# Patient Record
Sex: MALE | Employment: UNEMPLOYED | URBAN - METROPOLITAN AREA
[De-identification: names, ages, dates, MRNs, and addresses within clinical notes are randomized per-mention and may not be internally consistent; named-entity substitution may affect disease eponyms.]

---
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
## 2021-01-23 ENCOUNTER — ANCILLARY PROCEDURE (OUTPATIENT)
Dept: GENERAL RADIOLOGY | Facility: CLINIC | Age: 21
End: 2021-01-23
Attending: PHYSICIAN ASSISTANT
Payer: COMMERCIAL

## 2021-01-23 ENCOUNTER — OFFICE VISIT (OUTPATIENT)
Dept: URGENT CARE | Facility: URGENT CARE | Age: 21
End: 2021-01-23
Payer: COMMERCIAL

## 2021-01-23 VITALS
HEART RATE: 58 BPM | TEMPERATURE: 97.7 F | SYSTOLIC BLOOD PRESSURE: 117 MMHG | OXYGEN SATURATION: 99 % | DIASTOLIC BLOOD PRESSURE: 69 MMHG | RESPIRATION RATE: 16 BRPM

## 2021-01-23 DIAGNOSIS — J02.0 STREP THROAT: Primary | ICD-10-CM

## 2021-01-23 DIAGNOSIS — R04.2 BLOOD-TINGED SPUTUM: ICD-10-CM

## 2021-01-23 DIAGNOSIS — R07.81 RIB PAIN ON LEFT SIDE: ICD-10-CM

## 2021-01-23 DIAGNOSIS — R07.0 THROAT PAIN: ICD-10-CM

## 2021-01-23 DIAGNOSIS — R06.02 SOB (SHORTNESS OF BREATH): ICD-10-CM

## 2021-01-23 LAB
DEPRECATED S PYO AG THROAT QL EIA: POSITIVE
SARS-COV-2 RNA RESP QL NAA+PROBE: NORMAL
SPECIMEN SOURCE: ABNORMAL
SPECIMEN SOURCE: NORMAL

## 2021-01-23 PROCEDURE — U0003 INFECTIOUS AGENT DETECTION BY NUCLEIC ACID (DNA OR RNA); SEVERE ACUTE RESPIRATORY SYNDROME CORONAVIRUS 2 (SARS-COV-2) (CORONAVIRUS DISEASE [COVID-19]), AMPLIFIED PROBE TECHNIQUE, MAKING USE OF HIGH THROUGHPUT TECHNOLOGIES AS DESCRIBED BY CMS-2020-01-R: HCPCS | Performed by: PHYSICIAN ASSISTANT

## 2021-01-23 PROCEDURE — 87880 STREP A ASSAY W/OPTIC: CPT | Performed by: PHYSICIAN ASSISTANT

## 2021-01-23 PROCEDURE — 71101 X-RAY EXAM UNILAT RIBS/CHEST: CPT | Mod: LT | Performed by: RADIOLOGY

## 2021-01-23 PROCEDURE — 99214 OFFICE O/P EST MOD 30 MIN: CPT | Performed by: PHYSICIAN ASSISTANT

## 2021-01-23 PROCEDURE — U0005 INFEC AGEN DETEC AMPLI PROBE: HCPCS | Performed by: PHYSICIAN ASSISTANT

## 2021-01-23 RX ORDER — AMOXICILLIN 875 MG
875 TABLET ORAL 2 TIMES DAILY
Qty: 20 TABLET | Refills: 0 | Status: SHIPPED | OUTPATIENT
Start: 2021-01-23 | End: 2021-02-02

## 2021-01-23 RX ORDER — IBUPROFEN 800 MG/1
800 TABLET, FILM COATED ORAL 2 TIMES DAILY WITH MEALS
Qty: 20 TABLET | Refills: 0 | Status: SHIPPED | OUTPATIENT
Start: 2021-01-23 | End: 2021-02-02

## 2021-01-23 NOTE — PATIENT INSTRUCTIONS
"Discharge Instructions for COVID-19 Patients  You have--or may have--COVID-19. Please follow the instructions listed below.   If you have a weakened immune system, discuss with your doctor any other actions you need to take.  How can I protect others?  If you have symptoms (fever, cough, body aches or trouble breathing):    Stay home and away from others (self-isolate) until:  ? Your other symptoms have resolved (gotten better). And   ? You've had no fever--and no medicine that reduces fever--for 1 full day (24 hours). And   ? At least 10 days have passed since your symptoms started. (You may need to wait 20 days. Follow the advice of your care team.)  If you don't show symptoms, but testing showed that you have COVID-19:    Stay home and away from others (self-isolate) until at least 10 days have passed since the date of your first positive COVID-19 test.  During this time    Stay in your own room, even for meals. Use your own bathroom if you can.    Stay away from others in your home. No hugging, kissing or shaking hands. No visitors.    Don't go to work, school or anywhere else.    Clean \"high touch\" surfaces often (doorknobs, counters, handles). Use household cleaning spray or wipes.    You'll find a full list of  on the EPA website: www.epa.gov/pesticide-registration/list-n-disinfectants-use-against-sars-cov-2.    Cover your mouth and nose with a mask or other face covering to avoid spreading germs.    Wash your hands and face often. Use soap and water.    Caregivers in these groups are at risk for severe illness due to COVID-19:  ? People 65 years and older  ? People who live in a nursing home or long-term care facility  ? People with chronic disease (lung, heart, cancer, diabetes, kidney, liver, immunologic)  ? People who have a weakened immune system, including those who:    Are in cancer treatment    Take medicine that weakens the immune system, such as corticosteroids    Had a bone marrow or organ " transplant    Have an immune deficiency    Have poorly controlled HIV or AIDS    Are obese (body mass index of 40 or higher)    Smoke regularly    Caregivers should wear gloves while washing dishes, handling laundry and cleaning bedrooms and bathrooms.    Use caution when washing and drying laundry: Don't shake dirty laundry and use the warmest water setting that you can.    For more tips on managing your health at home, go to www.cdc.gov/coronavirus/2019-ncov/downloads/10Things.pdf.  How can I take care of myself at home?  1. Get lots of rest. Drink extra fluids (unless a doctor has told you not to).  2. Take Tylenol (acetaminophen) for fever or pain. If you have liver or kidney problems, ask your family doctor if it's okay to take Tylenol.   Adults can take either:   ? 650 mg (two 325 mg pills) every 4 to 6 hours, or   ? 1,000 mg (two 500 mg pills) every 8 hours as needed.  ? Note: Don't take more than 3,000 mg in one day. Acetaminophen is found in many medicines (both prescribed and over-the-counter medicines). Read all labels to be sure you don't take too much.   For children, check the Tylenol bottle for the right dose. The dose is based on the child's age or weight.  3. If you have other health problems (like cancer, heart failure, an organ transplant or severe kidney disease): Call your specialty clinic if you don't feel better in the next 2 days.  4. Know when to call 911. Emergency warning signs include:  ? Trouble breathing or shortness of breath  ? Pain or pressure in the chest that doesn't go away  ? Feeling confused like you haven't felt before, or not being able to wake up  ? Bluish-colored lips or face  5. Your doctor may have prescribed a blood thinner medicine. Follow their instructions.  Where can I get more information?    [facility] - About COVID-19: [email or web address]    CDC - What to Do If You're Sick: www.cdc.gov/coronavirus/2019-ncov/about/steps-when-sick.html    CDC -  Ending Home Isolation: www.cdc.gov/coronavirus/2019-ncov/hcp/disposition-in-home-patients.html    CDC - Caring for Someone: www.cdc.gov/coronavirus/2019-ncov/if-you-are-sick/care-for-someone.html    [facility] - Interim Guidance for Hospital Discharge to Home: www.health.[facility].[place]./diseases/coronavirus/hcp/hospdischarge.pdf    Below are the COVID-19 hotlines at the [place] Department of Health ([facility]). Interpreters are available.  ? For health questions: Call 300-597-2777 or 1-199.344.2116 (7 a.m. to 7 p.m.)  ? For questions about schools and childcare: Call 970-628-4573 or 1-673.566.9478 (7 a.m. to 7 p.m.)    For informational purposes only. Not to replace the advice of your health care provider. Clinically reviewed by Dr. Ceasar Moore.   Copyright   2020 [employer] [facility]. All rights reserved. [employer] [id number] - REV 01/05/21.

## 2021-01-23 NOTE — PROGRESS NOTES
Chief complaint:blood and saliva.  Red throat.  Left rib cage pain/clicking      Differential Diagnosis:  URI Adult/Peds:  Acute right otitis media, Acute left otitis media, Allergic rhinitis, Asthma exacerbation, Laryngitis, Peritonsillar abscess, Pneumonia, Sinusitis, Strep pharyngitis, Tonsilitis, Viral pharyngitis and Viral upper respiratory illness    Results for orders placed or performed in visit on 01/23/21   XR Ribs & Chest Lt 3v     Status: None    Narrative    XR RIBS & CHEST LT 3VW 1/23/2021 2:50 PM    HISTORY: Left upper axillary rib cracking with breathing. Blood in  salivary after brushing the back of his throat.; Throat pain; SOB  (shortness of breath); Rib pain on left side    COMPARISON: None.    FINDINGS: Negative chest. Both lungs clear. Heart size and pulmonary  vascularity normal. No left-sided rib fractures.    ANABELL ERVIN MD   Results for orders placed or performed in visit on 01/23/21   Streptococcus A Rapid Scr w Reflx to PCR     Status: Abnormal    Specimen: Throat   Result Value Ref Range    Strep Specimen Description Throat     Streptococcus Group A Rapid Screen Positive (A) NEG^Negative         ASSESSMENT:       ICD-10-CM    1. Strep throat  J02.0 amoxicillin (AMOXIL) 875 MG tablet   2. Throat pain  R07.0 Streptococcus A Rapid Scr w Reflx to PCR     Symptomatic COVID-19 Virus (Coronavirus) by PCR     XR Ribs & Chest Lt 3v     amoxicillin (AMOXIL) 875 MG tablet     CANCELED: XR Chest 2 Views   3. Blood-tinged sputum  R04.2    4. Rib pain on left side  R07.81 XR Ribs & Chest Lt 3v     ibuprofen (ADVIL/MOTRIN) 800 MG tablet     CANCELED: XR Chest 2 Views           PLAN: Positive for strep here today.  Contagious until on antibiotic for 24 hours.  Likely blood in the saliva is from his throat as his throat appears very  friable and he brushed the back of his throat.  Was an area that looked like it had been bleeding.  Strongly doubt it is coming from his lungs as he is not even  coughing.Not likely from stomach as he has no GERD or abdominal pain.   Given ibuprofen for the rib cage pain.  Likely strain.  X-ray showed no pneumothorax, no pneumonia or rib fracture.  I have discussed clinical findings with patient.  Side effects of medications discussed.  Symptomatic care is discussed.  I have discussed the possibility of  worsening symptoms and indication to RTC or go to the ER if they occur.  All questions are answered, patient indicates understanding of these issues and is in agreement with plan.   Patient care instructions are discussed/given at the end of visit.   Lots of rest and fluids.  Covid test pending. Covid instructions given.  Myra Ramon PA-C      SUBJECTIVE:  20-year-old male presents for spitting up blood in his saliva from the back of his throat.  Happened yesterday and 1 week ago.  Occurred after he brushed the back of his throat.  He denies any nasal discharge or nosebleeds.  He does have some mucus in the back of his throat.  He denies throat pain when he swallows.  No cough.  No fever.  Did have some chills yesterday.  No headache.  No vision changes.  No nausea or vomiting.  Does have a history of exercise-induced asthma.  Will uses inhaler with exercise or when his pollen allergies flare.  He is not noting any flareup of his allergies.  No known Covid exposure.  No diarrhea.  No abdominal pain.  No history of reflux.  Also has noticed some clicking left upper lateral rib cage when he breathes.  Occasional sharp pain in that same area.  Its not that he is short of breath but it hurts that area when he breathes and his rib cage move it moves.      Allergies   Allergen Reactions     No Known Drug Allergies        Past Medical History:   Diagnosis Date     Unspecified chronic suppurative otitis media             albuterol (PROAIR HFA/PROVENTIL HFA/VENTOLIN HFA) 108 (90 Base) MCG/ACT Inhaler, Inhale 2 puffs into the lungs every 6 hours as needed for shortness of  breath / dyspnea       EPINEPHrine (EPIPEN/ADRENACLICK/OR ANY BX GENERIC EQUIV) 0.3 MG/0.3ML injection 2-pack, Inject 0.3 mLs (0.3 mg) into the muscle once as needed for anaphylaxis       fluticasone (FLONASE) 50 MCG/ACT spray, USE 1 SPRAY INTO BOTH NOSTRILS DAILY       methylphenidate ER (CONCERTA) 18 MG CR tablet, Take 1 tablet (18 mg) by mouth every morning To fill on or after 5/1/16       nystatin-triamcinolone (MYCOLOG II) 001879-2.1 UNIT/GM-% external cream, Apply to area bid for 10 - 14 days, then bid as needed       VENTOLIN  (90 BASE) MCG/ACT Inhaler, INHALE TWO PUFFS BY MOUTH EVERY 6 HOURS AS NEEDED FOR SHORTNESS OF BREATH    No current facility-administered medications on file prior to visit.       Social History     Tobacco Use     Smoking status: Never Smoker     Smokeless tobacco: Never Used     Tobacco comment: mom smokes   Substance Use Topics     Alcohol use: No       ROS:  CONSTITUTIONAL: Negative for fatigue or fever.  EYES: Negative for eye problems.  ENT: Neg for ST.  RESP: No cough.  CV: as above.  GI: Negative for vomiting.  MUSCULOSKELETAL:  Negative for significant muscle or joint pains.  NEUROLOGIC: Negative for headaches.  SKIN: Negative for rash.  PSYCH: Normal mentation for age.    OBJECTIVE:  /69 (BP Location: Right arm, Patient Position: Sitting, Cuff Size: Adult Regular)   Pulse 58   Temp 97.7  F (36.5  C) (Oral)   Resp 16   SpO2 99%   GENERAL APPEARANCE: Healthy, alert and no distress.  EYES:Conjunctiva/sclera clear.  EARS: No cerumen.   Ear canals w/o erythema.  TM's intact w/o erythema.    NOSE/MOUTH: Nasal turbinates are red and inflamed.    SINUSES: No maxillary sinus tenderness.  THROAT: Moderate erythema.  No exudate.  Friable areas, especially left tonsillar arch area.  Almost looks as if this had been bleeding at 1 point.  No erythema w/o tonsillar enlargement .   NECK: Supple, nontender, no lymphadenopathy.  RESP: Lungs clear to auscultation - no rales,  rhonchi or wheezes  CV: Regular rate and rhythm, normal S1 S2, no murmur noted.  NEURO: Awake, alert    SKIN: No rashes  Chest wall and rib cage nontender to palpation.  He points to the left lateral upper rib cage where he hears the clicking/cracking and feels sharp pain occasionally.  Abdomen-soft, nontender.  No hepatosplenomegaly.  Normal active bowel sounds.  No rigidity, guarding or rebound.     Myra Ramon PA-C

## 2021-01-24 LAB
LABORATORY COMMENT REPORT: NORMAL
SARS-COV-2 RNA RESP QL NAA+PROBE: NEGATIVE
SPECIMEN SOURCE: NORMAL

## 2021-04-02 ENCOUNTER — OFFICE VISIT (OUTPATIENT)
Dept: FAMILY MEDICINE | Facility: CLINIC | Age: 21
End: 2021-04-02
Payer: COMMERCIAL

## 2021-04-02 VITALS
BODY MASS INDEX: 21.19 KG/M2 | SYSTOLIC BLOOD PRESSURE: 122 MMHG | TEMPERATURE: 98 F | HEIGHT: 68 IN | DIASTOLIC BLOOD PRESSURE: 70 MMHG | WEIGHT: 139.8 LBS | HEART RATE: 60 BPM | OXYGEN SATURATION: 98 %

## 2021-04-02 DIAGNOSIS — N47.8 FORESKIN PROBLEM: ICD-10-CM

## 2021-04-02 DIAGNOSIS — B35.6 TINEA CRURIS: Primary | ICD-10-CM

## 2021-04-02 DIAGNOSIS — J02.9 SORE THROAT: ICD-10-CM

## 2021-04-02 LAB
DEPRECATED S PYO AG THROAT QL EIA: NEGATIVE
SPECIMEN SOURCE: NORMAL
SPECIMEN SOURCE: NORMAL
STREP GROUP A PCR: NOT DETECTED

## 2021-04-02 PROCEDURE — 87651 STREP A DNA AMP PROBE: CPT | Performed by: FAMILY MEDICINE

## 2021-04-02 PROCEDURE — 99N1174 PR STATISTIC STREP A RAPID: Performed by: FAMILY MEDICINE

## 2021-04-02 PROCEDURE — 99213 OFFICE O/P EST LOW 20 MIN: CPT | Performed by: FAMILY MEDICINE

## 2021-04-02 RX ORDER — CLOTRIMAZOLE 1 %
CREAM (GRAM) TOPICAL
Qty: 28 G | Refills: 0 | Status: SHIPPED | OUTPATIENT
Start: 2021-04-02

## 2021-04-02 ASSESSMENT — MIFFLIN-ST. JEOR: SCORE: 1623.63

## 2021-04-02 NOTE — PROGRESS NOTES
Assessment & Plan     Tinea cruris  Recurrent, in the sitting of Uncircumcised.   Good hygiene, more washing  Referred to Urology, for Circumcision consult.  - clotrimazole (LOTRIMIN) 1 % external cream; Apply to area bid for 14 days    Foreskin problem    - UROLOGY ADULT REFERRAL; Future    Sore throat  Negative Rapid strep  Was treated for Strep throat, back in 01/ 23/2021  Finished 10 days of Amoxil.    Work on weight loss  Regular exercise    No follow-ups on file.    Get Pereira MD  [facility]    Trixie Grady is a 20 year old who presents for the following health issues:  Recurrent skin rash, itchy under fork skin. No discharges and no pain.  Patient had positive strep throat back in January, was treated with amoxicillin for 10 days.  Has no recurrent symptoms.  Has no fever, no sore throat, no headache.    Rash/ yeast   Onset/Duration: on and off for years   Description  Location: on penis   Character: red, and pain   Itching: moderate  Intensity:  moderate  Progression of Symptoms:  same and intermittent  Accompanying signs and symptoms:   Fever: no  Body aches or joint pain: no  Sore throat symptoms:yes - HAS HAD STREP, 1 MONTH AGO    Recent cold symptoms: no  History:           Previous episodes of similar rash: on & off   New exposures:  None  Recent travel: no  Exposure to similar rash: no  Precipitating or alleviating factors: no  Therapies tried and outcome: only washing up more often       Acute Illness  Acute illness concerns: wks   Onset/Duration:   Symptoms:  Fever: no  Chills/Sweats: no  Headache (location?): no  Sinus Pressure: no  Conjunctivitis:  no  Ear Pain: no  Rhinorrhea: no  Congestion: no  Sore Throat: YES- feels like food gets struck no problem with liquids   Cough: no  Wheeze: no  Decreased Appetite: no  Nausea: no  Vomiting: no  Diarrhea: no  Dysuria/Freq.: no  Dysuria or Hematuria: no  Fatigue/Achiness: no  Sick/Strep Exposure: no  Therapies  tried and outcome: nothing more than last antibiotic given 1 month ago       Review of Systems   Constitutional, HEENT, cardiovascular, pulmonary, gi and gu systems are negative, except as otherwise noted.      Objective    There were no vitals taken for this visit.  There is no height or weight on file to calculate BMI.  Physical Exam   GENERAL: healthy, alert and no distress  HENT: ear canals and TM's normal, nose and mouth without ulcers or lesions  NECK: no adenopathy, no asymmetry, masses, or scars and thyroid normal to palpation  RESP: lungs clear to auscultation - no rales, rhonchi or wheezes  SKIN: Genital area, Uncircumcised,   Redness and skin irriated under skin.  No discharges    Orders Placed This Encounter   Procedures     Streptococcus A Rapid Scr w Reflx to PCR     **Must send ESwab. If screen negative, lab will order group A Strep PCR**     Group A Streptococcus PCR Throat Swab         Get Pereira MD

## 2021-05-10 ENCOUNTER — OFFICE VISIT (OUTPATIENT)
Dept: UROLOGY | Facility: CLINIC | Age: 21
End: 2021-05-10
Payer: COMMERCIAL

## 2021-05-10 ENCOUNTER — TELEPHONE (OUTPATIENT)
Dept: UROLOGY | Facility: CLINIC | Age: 21
End: 2021-05-10

## 2021-05-10 ENCOUNTER — PREP FOR PROCEDURE (OUTPATIENT)
Dept: UROLOGY | Facility: CLINIC | Age: 21
End: 2021-05-10

## 2021-05-10 VITALS — HEART RATE: 72 BPM | OXYGEN SATURATION: 97 % | DIASTOLIC BLOOD PRESSURE: 68 MMHG | SYSTOLIC BLOOD PRESSURE: 109 MMHG

## 2021-05-10 DIAGNOSIS — N47.8 EXCESSIVE FORESKIN: Primary | ICD-10-CM

## 2021-05-10 PROCEDURE — 99202 OFFICE O/P NEW SF 15 MIN: CPT | Performed by: UROLOGY

## 2021-05-10 RX ORDER — CEFAZOLIN SODIUM 2 G/100ML
2 INJECTION, SOLUTION INTRAVENOUS SEE ADMIN INSTRUCTIONS
Status: CANCELLED | OUTPATIENT
Start: 2021-05-10

## 2021-05-10 RX ORDER — CEFAZOLIN SODIUM 2 G/100ML
2 INJECTION, SOLUTION INTRAVENOUS
Status: CANCELLED | OUTPATIENT
Start: 2021-05-10

## 2021-05-10 NOTE — PROGRESS NOTES
S: Patient is a 21-year-old male who was request to be seen by Dr. Flip Pereira for a consultation with regard to patient's excessive foreskin.  Patient has had history of balanitis.  He has difficulty with retracting his foreskin after sexual activities.  He is interested in having a vasectomy done.  Current Outpatient Medications   Medication Sig Dispense Refill     albuterol (PROAIR HFA/PROVENTIL HFA/VENTOLIN HFA) 108 (90 Base) MCG/ACT Inhaler Inhale 2 puffs into the lungs every 6 hours as needed for shortness of breath / dyspnea 1 Inhaler 1     clotrimazole (LOTRIMIN) 1 % external cream Apply to area bid for 14 days 28 g 0     EPINEPHrine (EPIPEN/ADRENACLICK/OR ANY BX GENERIC EQUIV) 0.3 MG/0.3ML injection 2-pack Inject 0.3 mLs (0.3 mg) into the muscle once as needed for anaphylaxis 0.6 mL 1     fluticasone (FLONASE) 50 MCG/ACT spray USE 1 SPRAY INTO BOTH NOSTRILS DAILY 16 g 1     methylphenidate ER (CONCERTA) 18 MG CR tablet Take 1 tablet (18 mg) by mouth every morning To fill on or after 5/1/16 31 tablet 0     VENTOLIN  (90 BASE) MCG/ACT Inhaler INHALE TWO PUFFS BY MOUTH EVERY 6 HOURS AS NEEDED FOR SHORTNESS OF BREATH 18 g 1     Allergies   Allergen Reactions     Cats      No Known Drug Allergies      Other Environmental Allergy      Past Medical History:   Diagnosis Date     Unspecified chronic suppurative otitis media      No past surgical history on file.   Family History   Problem Relation Age of Onset     Alcohol/Drug Father         alcoholism     Alcohol/Drug Paternal Grandmother         alcoholism     C.A.D. No family hx of      Diabetes No family hx of      Hypertension No family hx of      Cerebrovascular Disease No family hx of      Breast Cancer No family hx of      Cancer - colorectal No family hx of      Prostate Cancer No family hx of      Social History     Socioeconomic History     Marital status: Single     Spouse name: None     Number of children: None     Years of education: None      Highest education level: None   Occupational History     None   Social Needs     Financial resource strain: None     Food insecurity     Worry: None     Inability: None     Transportation needs     Medical: None     Non-medical: None   Tobacco Use     Smoking status: Never Smoker     Smokeless tobacco: Never Used     Tobacco comment: mom smokes   Substance and Sexual Activity     Alcohol use: No     Drug use: No     Sexual activity: Never   Lifestyle     Physical activity     Days per week: None     Minutes per session: None     Stress: None   Relationships     Social connections     Talks on phone: None     Gets together: None     Attends Restoration service: None     Active member of club or organization: None     Attends meetings of clubs or organizations: None     Relationship status: None     Intimate partner violence     Fear of current or ex partner: None     Emotionally abused: None     Physically abused: None     Forced sexual activity: None   Other Topics Concern     None   Social History Narrative     None       REVIEW OF SYSTEMS  =================  C: NEGATIVE for fever, chills, change in weight  I: NEGATIVE for worrisome rashes, moles or lesions  E/M: NEGATIVE for ear, mouth and throat problems  R: NEGATIVE for significant cough or SHORTNESS OF BREATH  CV:  NEGATIVE for chest pain, palpitations or peripheral edema  GI: NEGATIVE for nausea, abdominal pain, heartburn, or change in bowel habits  NEURO: NEGATIVE numbness/weakness  : see HPI  PSYCH: NEGATIVE depression/anxiety  LYmph: no new enlarged lymph nodes  Ortho: no new trauma/movements      Physical Exam:  /68 (BP Location: Right arm, Patient Position: Chair, Cuff Size: Adult Regular)   Pulse 72   SpO2 97%    Patient is pleasant, in no acute distress, good general condition.  Heart:  negative, PMI normal  Lung: no evidence of respiratory distress    Abdomen: Soft, nondistended, non tender. No masses. No rebound or guarding.   Exam: Penis  with excessive foreskin.  Testes no masses.  No scrotal skin lesion.  Skin: Warm and dry.  No redness.  Neuro: grossly normal  Musculaskeletal: moving all extremities  Psych normal mood and affect  Musculoskeletal  moving all extremities  Hematologic/Lymphatic/Immunologic: normal ant/post cervical, axillary, supraclavicular and inguinal nodes    Assessment/Plan:   21-year-old male with excessive foreskin and history of balanitis in the past.  He is interested in having vasectomy done.  Risks of procedure discussed with patient.  I will schedule him for an elective procedure circumcision in future.

## 2021-05-10 NOTE — PATIENT INSTRUCTIONS
Surgery Preparation    You will receive a phone call from the [facility] within 1-2 days. If you do not receive a call within 2 days, contact [phone] to schedule the procedure. You can write the location/date/time of surgery in the space provided below for your records.    Location of Surgery:                                          Date of Surgery:                                                 Time of Arrival:                                                   Time of Surgery:                                                   Please arrange an appointment with a primary care provider for a pre-op physical. This is a mandatory appointment that needs to be completed within the two weeks prior to your surgery date. This gives clearance for you to receive anesthesia during your procedure. If you have had a pre-op physical within 30 days of your surgery date, you may not need another one. Check with your provider.    If you are having a Same Day Surgery, you must have a  to and from the procedure. Someone needs to stay with you post-operatively for 12 hours.     Do not eat or drink any solids, milk products, or pulpy juices after midnight the night before your surgery. You may have clear liquids up to 8 hours prior to the surgery. This would include water, soda, coffee (without cream), tea, broth, and jello.    Please stop all over the counter blood thinners such as Aspirin, Advil, Aleve, Ibuprofen, Motrin, and Excedrin one week prior to surgery. Please discontinue prescription blood thinners 5-7 days prior to surgery, per your primary physician's orders.     Please check with your insurance company to confirm that your procedure is covered, and that the facility is in-network.     Call the Urology Department @ [phone] if you have questions about your surgery, or if you need to reschedule your procedure.     Patient Education     Adult Circumcision   Circumcision is a procedure to  remove the foreskin, the loose fold of skin that covers the head of the penis. You may have a condition that requires circumcision. Or you may want to be circumcised for personal reasons. Either way, you will want to know what to expect. Read on to learn more about adult circumcision and how it s done.     Before the procedure  Tell your healthcare provider about all medicines you take and any allergies you have. Cream to numb the skin of the penis may be applied 30 to 60 minutes before the procedure. There will be some swelling and soreness after the procedure, so arrange for an adult family member or friend to drive you home.   During the procedure    The penis and nearby area are cleaned and prepared for the procedure.    An IV (intravenous) line is placed in your hand or arm. It supplies fluids and medicine. This may include medicine (anesthesia) to prevent pain. Depending on what type of anesthesia you get, you may be awake, drowsy, or asleep during the procedure. The skin of the penis may also be numbed with injections of local anesthesia.    Once the penis is numb or you are drowsy or asleep, cuts (incisions) are made in the foreskin. The foreskin is then removed.    The incisions are closed with stitches or surgical glue.    The incision is covered with ointment. A bandage is put on the penis.    After the procedure  You will be taken to a recovery area where you ll recover from the anesthesia. Nurses will check on you as you rest. They can also give you pain medicine if needed. Your healthcare provider will tell you when it s OK for you to go home. This will be the same day. When you dress to go home, wear snug-fitting, brief-style underwear. This will help hold your bandage in place. You will also be given care instructions for when you return home.   What to expect    You will likely see a crust of blood or yellowish coating around the head of the penis. Don't remove scabs. It s OK if they fall off on  their own.    The penis will swell. It may bleed a little around the incision.    The head of the penis will be red or black-and-blue.    You may have pain with urination for the first few days.    Take pain medicine as instructed by your healthcare provider.    Healing takes about  2 weeks. The stitches should dissolve on their own.    Caring for your penis    You may shower  24 hours after surgery. When drying off, gently pat the penis dry.    Don t take a bath or use a hot tub, Jacuzzi, or swimming pool for  2 weeks after surgery.    Follow your healthcare provider s instructions for bandage care. Change or remove the bandage only when told to do so by your provider. This will likely be the day after surgery.    Don't have any kind of sexual activity for  4 to 6  weeks after surgery. An erection can cause the incisions to open. Ask your healthcare provider what you can do to help stop erections.    Follow-up care  Make a follow-up appointment with your healthcare provider, or as advised.   When to call your healthcare provider  Call the healthcare provider right away if you have any of the following:     Fever of 100.4  F ( 38 C ) or higher, or as directed by your provider    More redness, bruising, or swelling of the penis    Discharge that is heavy, a greenish color, or lasts more than a week    Bleeding that isn t controlled by applying gentle pressure    Inability to urinate  [place] last reviewed this educational content on 10/1/2019    6308-9601 [employer]. All rights reserved. This information is not intended as a substitute for professional medical care. Always follow your healthcare professional's instructions.

## 2021-09-18 ENCOUNTER — HEALTH MAINTENANCE LETTER (OUTPATIENT)
Age: 21
End: 2021-09-18

## 2022-01-08 ENCOUNTER — HEALTH MAINTENANCE LETTER (OUTPATIENT)
Age: 22
End: 2022-01-08

## 2022-11-20 ENCOUNTER — HEALTH MAINTENANCE LETTER (OUTPATIENT)
Age: 22
End: 2022-11-20

## 2023-04-15 ENCOUNTER — HEALTH MAINTENANCE LETTER (OUTPATIENT)
Age: 23
End: 2023-04-15